# Patient Record
Sex: FEMALE | Race: WHITE
[De-identification: names, ages, dates, MRNs, and addresses within clinical notes are randomized per-mention and may not be internally consistent; named-entity substitution may affect disease eponyms.]

---

## 2021-01-29 ENCOUNTER — TRANSCRIPTION ENCOUNTER (OUTPATIENT)
Age: 41
End: 2021-01-29

## 2021-03-07 ENCOUNTER — TRANSCRIPTION ENCOUNTER (OUTPATIENT)
Age: 41
End: 2021-03-07

## 2021-03-15 ENCOUNTER — TRANSCRIPTION ENCOUNTER (OUTPATIENT)
Age: 41
End: 2021-03-15

## 2021-03-30 ENCOUNTER — TRANSCRIPTION ENCOUNTER (OUTPATIENT)
Age: 41
End: 2021-03-30

## 2021-08-29 ENCOUNTER — TRANSCRIPTION ENCOUNTER (OUTPATIENT)
Age: 41
End: 2021-08-29

## 2022-01-25 ENCOUNTER — TRANSCRIPTION ENCOUNTER (OUTPATIENT)
Age: 42
End: 2022-01-25

## 2022-07-13 ENCOUNTER — NON-APPOINTMENT (OUTPATIENT)
Age: 42
End: 2022-07-13

## 2023-01-16 ENCOUNTER — NON-APPOINTMENT (OUTPATIENT)
Age: 43
End: 2023-01-16

## 2023-08-09 ENCOUNTER — TRANSCRIPTION ENCOUNTER (OUTPATIENT)
Age: 43
End: 2023-08-09

## 2023-08-10 ENCOUNTER — APPOINTMENT (OUTPATIENT)
Dept: ORTHOPEDIC SURGERY | Facility: CLINIC | Age: 43
End: 2023-08-10
Payer: COMMERCIAL

## 2023-08-10 VITALS — HEIGHT: 66 IN | WEIGHT: 185 LBS | BODY MASS INDEX: 29.73 KG/M2

## 2023-08-10 DIAGNOSIS — Z78.9 OTHER SPECIFIED HEALTH STATUS: ICD-10-CM

## 2023-08-10 PROBLEM — Z00.00 ENCOUNTER FOR PREVENTIVE HEALTH EXAMINATION: Status: ACTIVE | Noted: 2023-08-10

## 2023-08-10 PROCEDURE — 29405 APPL SHORT LEG CAST: CPT | Mod: LT

## 2023-08-10 PROCEDURE — 99204 OFFICE O/P NEW MOD 45 MIN: CPT | Mod: 57,25

## 2023-08-10 PROCEDURE — 28470 CLTX METATARSAL FX WO MNP EA: CPT | Mod: LT

## 2023-08-10 NOTE — IMAGING
[Left] : left foot [The fracture is in acceptable alignment. There is progression in healing seen] : The fracture is in acceptable alignment. There is progression in healing seen [de-identified] : Sheehan fracture

## 2023-08-10 NOTE — HISTORY OF PRESENT ILLNESS
[Sudden] : sudden [8] : 8 [7] : 7 [Dull/Aching] : dull/aching [Throbbing] : throbbing [Constant] : constant [Rest] : rest [Meds] : meds [Full time] : Work status: full time [de-identified] : Patient presents today with left foot fracture after falling over the baby gate at home on 8/6/23. Went to [facility] on 8/7/23, had xrays, informed she had a Sheehan fracture, was splinted and given crutches. Patient states she has lateral pain. Patient admits to taking Ibuprofen for pain. Patient is using crutches for ambulation. [] : no [FreeTextEntry1] : left foot fracture [FreeTextEntry3] : 8/6/23 [FreeTextEntry5] :  falling over the baby gate at home [de-identified] : left foot xrays at [facility] [de-identified] :

## 2023-08-10 NOTE — PHYSICAL EXAM
[Left] : left foot and ankle [5th] : 5th [2+] : posterior tibialis pulse: 2+ [Normal] : saphenous nerve sensation normal [] : ambulation with crutches [FreeTextEntry9] : Gross ROM of toes  [de-identified] : deferred

## 2023-08-20 ENCOUNTER — NON-APPOINTMENT (OUTPATIENT)
Age: 43
End: 2023-08-20

## 2023-09-07 ENCOUNTER — APPOINTMENT (OUTPATIENT)
Dept: ORTHOPEDIC SURGERY | Facility: CLINIC | Age: 43
End: 2023-09-07
Payer: COMMERCIAL

## 2023-09-07 VITALS — WEIGHT: 185 LBS | HEIGHT: 66 IN | BODY MASS INDEX: 29.73 KG/M2

## 2023-09-07 PROCEDURE — 73630 X-RAY EXAM OF FOOT: CPT | Mod: LT

## 2023-09-07 PROCEDURE — 99214 OFFICE O/P EST MOD 30 MIN: CPT | Mod: 24

## 2023-09-07 RX ORDER — CEPHALEXIN 500 MG/1
500 CAPSULE ORAL
Qty: 28 | Refills: 0 | Status: ACTIVE | COMMUNITY
Start: 2023-09-07 | End: 1900-01-01

## 2023-09-07 NOTE — HISTORY OF PRESENT ILLNESS
[de-identified] : Follow up left foot Sheehan fracture from 8/6/23. Patient states she still has some lateral pain. Patient denies takin pain medication. Patient is in a short cast and using a post op shoe. Patient is using crutches for ambulation.  Today's Pain 6/10  Left elbow Patient has been experiencing left elbow pain and infection for 2 weeks. She received 1 week of antibiotics in the hospital and had it drained surgically x2, diagnosis of infected olecranon bursitis. Patient has still been experiencing swelling and redness of her elbow since leaving the hospital.

## 2023-09-07 NOTE — PHYSICAL EXAM
[5th] : 5th [2+] : posterior tibialis pulse: 2+ [Normal] : saphenous nerve sensation normal [Left] : left elbow [] : no gross deformity [FreeTextEntry9] : Gross ROM of toes  [de-identified] : deferred

## 2023-09-07 NOTE — IMAGING
[Left] : left foot [No loss of surgical correlation. Bony alignment acceptable. Hardware in appropriate position] : No loss of surgical correlation. Bony alignment acceptable. Hardware in appropriate position [de-identified] : Beginning of bony callus formation

## 2023-09-07 NOTE — ASSESSMENT
[FreeTextEntry1] : 44 y/o patient following up for L 5th metatarsal fracture and L elbow cellulitis and bursitis. Patient still experiencing some tenderness and swelling to left elbow. X-rays L foot show some healing of fracture but not enough to take off cast.  - Continue to stay in cast for 1 more week  - NWB LLE, keep using crutches for ambulation  - Recommend: - rest - ice - compression - elevation  Elbow:  - Rx given for Keflex 500mg, continue to take for 7 days  - Monitor for worsening signs of infection (fevers, chills, pain)  Follow up in 1 week for both elbow and foot

## 2023-09-22 ENCOUNTER — APPOINTMENT (OUTPATIENT)
Dept: ORTHOPEDIC SURGERY | Facility: CLINIC | Age: 43
End: 2023-09-22
Payer: COMMERCIAL

## 2023-09-22 DIAGNOSIS — L03.114 CELLULITIS OF LEFT UPPER LIMB: ICD-10-CM

## 2023-09-22 PROCEDURE — 99024 POSTOP FOLLOW-UP VISIT: CPT

## 2023-09-22 PROCEDURE — 73070 X-RAY EXAM OF ELBOW: CPT | Mod: LT

## 2023-09-22 PROCEDURE — 73620 X-RAY EXAM OF FOOT: CPT | Mod: LT

## 2023-10-27 ENCOUNTER — APPOINTMENT (OUTPATIENT)
Dept: ORTHOPEDIC SURGERY | Facility: CLINIC | Age: 43
End: 2023-10-27
Payer: COMMERCIAL

## 2023-10-27 VITALS — WEIGHT: 185 LBS | HEIGHT: 66 IN | BODY MASS INDEX: 29.73 KG/M2

## 2023-10-27 DIAGNOSIS — S99.192A OTHER PHYSEAL FRACTURE OF LEFT METATARSAL, INITIAL ENCOUNTER FOR CLOSED FRACTURE: ICD-10-CM

## 2023-10-27 DIAGNOSIS — M70.22 OLECRANON BURSITIS, LEFT ELBOW: ICD-10-CM

## 2023-10-27 PROCEDURE — 73630 X-RAY EXAM OF FOOT: CPT | Mod: LT

## 2023-10-27 PROCEDURE — 99024 POSTOP FOLLOW-UP VISIT: CPT

## 2023-10-27 RX ORDER — DICLOFENAC SODIUM 1% 10 MG/G
1 GEL TOPICAL 4 TIMES DAILY
Qty: 1 | Refills: 1 | Status: ACTIVE | COMMUNITY
Start: 2023-10-27 | End: 1900-01-01

## 2024-04-16 ENCOUNTER — APPOINTMENT (OUTPATIENT)
Dept: ORTHOPEDIC SURGERY | Facility: CLINIC | Age: 44
End: 2024-04-16
Payer: COMMERCIAL

## 2024-04-16 VITALS — WEIGHT: 185 LBS | BODY MASS INDEX: 29.73 KG/M2 | HEIGHT: 66 IN

## 2024-04-16 DIAGNOSIS — Z78.9 OTHER SPECIFIED HEALTH STATUS: ICD-10-CM

## 2024-04-16 PROCEDURE — 28470 CLTX METATARSAL FX WO MNP EA: CPT

## 2024-04-16 PROCEDURE — 73610 X-RAY EXAM OF ANKLE: CPT | Mod: LT

## 2024-04-16 PROCEDURE — 99214 OFFICE O/P EST MOD 30 MIN: CPT | Mod: 25

## 2024-04-16 PROCEDURE — 73630 X-RAY EXAM OF FOOT: CPT | Mod: LT

## 2024-04-16 NOTE — ASSESSMENT
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 Kapadia in a [facility] from 8/2023. Patient continues to have persistent pain and symptoms interfering with her ADLs. X-rays today demonstrating non-union of fifth metatarsal lantigua fracture with partial callus formation at fracture site. Fracture is in acceptable position and alignment, fracture gap <1 cm. -Discussed with patient surgical vs. non-surgical options of ORIF vs. bone stimulator. Will submit auth for bone stimulator -Activities as tolerated, avoid strenuous/impact related activities -Rest, ice, compression, elevation, NSAIDs PRN for pain.  -All questions answered -F/u after bone stimulator fitting  The diagnosis was explained in detail. The potential non-surgical and surgical treatments were reviewed. The relative risks and benefits of each option were considered relative to the patients age, activity level, medical history, symptom severity and previously attempted treatments.  The patient was advised to consult with their primary medical provider prior to initiation of any new medications to reduce the risk of adverse effects specific to their long-term home medications and medical history. The risk of gastrointestinal irritation and kidney injury specific to long-term NSAID use was discussed.  Entered by Mj Lagos PA-C acting as scribe. Dr. Torres Attestation The documentation recorded by the scribe, in my presence, accurately reflects the service I, Dr. Torres, personally performed, and the decisions made by me with my edits as appropriate.

## 2024-04-16 NOTE — HISTORY OF PRESENT ILLNESS
[Sudden] : sudden [Dull/Aching] : dull/aching [Throbbing] : throbbing [Household chores] : household chores [Leisure] : leisure [Social interactions] : social interactions [Rest] : rest [6] : 6 [4] : 4 [Sharp] : sharp [Constant] : constant [Full time] : Work status: full time [de-identified] : Patient here for left foot. Patient had 5th metatarsal FX treated by Dr. Kapadia 8/6/2023. Patient states she tripped over a baby gate in 8/2023. Patient states she was put in a cast for approx 6 weeks. Patient states she has occasional throbbing and dull aching pain with weight bearing.  [] : Post Surgical Visit: no [FreeTextEntry1] : Left foot  [FreeTextEntry3] : 8/2023 [FreeTextEntry5] : Patient states she tripped over a baby gate [de-identified] : Movement  [de-identified] :

## 2024-04-16 NOTE — PHYSICAL EXAM
[de-identified] : Examination of the left foot and ankle is as follows: INSPECTION: no swelling, no ecchymosis, no abrasion, laceration, no erythema PALPATION: mildly ttp over 5th metatarsal base ROM: plantarflexion 30 degrees, inversion 25 degrees, eversion 15 degrees, dorsiflexion 15 degrees STRENGTH: dorsiflexion 5/5, plantar flexion 5/5, inversion 5/5, eversion 5/5, EHL 5/5, FHL 5/5 VASCULAR: dorsalis pedis pulse: 2+, posterior tibialis pulse: 2+ NEURO: Sensation present to light touch in all distributions GAIT: mildly antalgic, ambulation without assisted devices  X-rays of the left ankle is as follows:  Ankle 3 view: non-union of 5th metatarsal base fracture at the metaphyseal-diaphyseal junction with minimal callus at fracture site, fracture is in proper position and alignment. Fracture gap < 1cm

## 2024-05-01 ENCOUNTER — APPOINTMENT (OUTPATIENT)
Dept: ORTHOPEDIC SURGERY | Facility: CLINIC | Age: 44
End: 2024-05-01
Payer: COMMERCIAL

## 2024-05-01 ENCOUNTER — NON-APPOINTMENT (OUTPATIENT)
Age: 44
End: 2024-05-01

## 2024-05-01 DIAGNOSIS — S92.355K NONDISPLACED FRACTURE OF FIFTH METATARSAL BONE, LEFT FOOT, SUBSEQUENT ENCOUNTER FOR FRACTURE WITH NONUNION: ICD-10-CM

## 2024-05-01 PROCEDURE — 20974 ESTIM AID BONE HEALG N-INVAS: CPT | Mod: 58

## 2024-05-01 PROCEDURE — 99213 OFFICE O/P EST LOW 20 MIN: CPT | Mod: 25

## 2024-05-01 NOTE — ASSESSMENT
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 Kapadia in [place] [facility] [place] 8/[place]23. Patient continues to have persistent pain and symptoms interfering with her ADLs. X-rays  demonstrating non-union of fifth metatarsal lantigua fracture with partial callus formation at fracture site. Fracture is in acceptable position and alignment, fracture gap <1 cm. -Here today for bone stim fitting. -Activities as tolerated, avoid strenuous/impact related activities -Rest, ice, compression, elevation, NSAIDs PRN for pain.  -All questions answered -F/u 6 weeks with repeat left foot xrays  The diagnosis was explained in detail. The potential non-surgical and surgical treatments were reviewed. The relative risks and benefits of each option were considered relative to the patients age, activity level, medical history, symptom severity and previously attempted treatments.  The patient was advised to consult with their primary medical provider prior to initiation of any new medications to reduce the risk of adverse effects specific to their long-term home medications and medical history. The risk of gastrointestinal irritation and kidney injury specific to long-term NSAID use was discussed.  Entered by Mj Lagos PA-C acting as scribe. Dr. Torres Attestation The documentation recorded by the scribe, in my presence, accurately reflects the service I, Dr. Torres, personally performed, and the decisions made by me with my edits as appropriate.

## 2024-05-01 NOTE — HISTORY OF PRESENT ILLNESS
[Sudden] : sudden [6] : 6 [4] : 4 [Dull/Aching] : dull/aching [Sharp] : sharp [Throbbing] : throbbing [Constant] : constant [Household chores] : household chores [Leisure] : leisure [Social interactions] : social interactions [Rest] : rest [Full time] : Work status: full time [de-identified] : Patient here for left foot. Patient had 5th metatarsal FX treated by Dr. Kapadia 8/6/2023. Patient states she tripped over a baby gate in 8/2023. 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 Patient here for bone stimulator fitting.  [] : Post Surgical Visit: no [FreeTextEntry1] : Left foot  [FreeTextEntry3] : 8/2023 [FreeTextEntry5] : Patient states she tripped over a baby gate [de-identified] : Movement  [de-identified] :

## 2024-05-01 NOTE — PHYSICAL EXAM
[de-identified] : Examination of the left foot and ankle is as follows: INSPECTION: no swelling, no ecchymosis, no abrasion, laceration, no erythema PALPATION: mildly ttp over 5th metatarsal base ROM: plantarflexion 30 degrees, inversion 25 degrees, eversion 15 degrees, dorsiflexion 15 degrees STRENGTH: dorsiflexion 5/5, plantar flexion 5/5, inversion 5/5, eversion 5/5, EHL 5/5, FHL 5/5 VASCULAR: dorsalis pedis pulse: 2+, posterior tibialis pulse: 2+ NEURO: Sensation present to light touch in all distributions GAIT: mildly antalgic, ambulation without assisted devices  X-rays of the left ankle is as follows:  Ankle 3 view: non-union of 5th metatarsal base fracture at the metaphyseal-diaphyseal junction with minimal callus at fracture site, fracture is in proper position and alignment. Fracture gap < 1cm

## 2024-06-12 ENCOUNTER — APPOINTMENT (OUTPATIENT)
Dept: ORTHOPEDIC SURGERY | Facility: CLINIC | Age: 44
End: 2024-06-12
Payer: COMMERCIAL

## 2024-06-12 DIAGNOSIS — S92.355D NONDISPLACED FRACTURE OF FIFTH METATARSAL BONE, LEFT FOOT, SUBSEQUENT ENCOUNTER FOR FRACTURE WITH ROUTINE HEALING: ICD-10-CM

## 2024-06-12 PROCEDURE — 99024 POSTOP FOLLOW-UP VISIT: CPT

## 2024-06-12 PROCEDURE — 73630 X-RAY EXAM OF FOOT: CPT | Mod: LT

## 2024-06-12 NOTE — HISTORY OF PRESENT ILLNESS
[Sudden] : sudden [6] : 6 [4] : 4 [Sharp] : sharp [Constant] : constant [Household chores] : household chores [Leisure] : leisure [Social interactions] : social interactions [Rest] : rest [Full time] : Work status: full time [de-identified] : Patient here for left foot. Patient had 5th metatarsal lantigua FX treated by Dr. Kapadia 8/6/2023. Patient had bone stimulator fitting on 5/1/24. Patient states she has a constant sharp pain with ambulation towards midfoot region.  Reports pain at base of 5th metatarsal is improved compared to last visit.   [] : Post Surgical Visit: no [FreeTextEntry1] : Left foot  [FreeTextEntry3] : 8/2023 [FreeTextEntry5] : Patient states she tripped over a baby gate [de-identified] : Movement  [de-identified] :

## 2024-06-12 NOTE — PHYSICAL EXAM
[de-identified] : Examination of the left foot and ankle is as follows: INSPECTION: no swelling, no ecchymosis, no abrasion, laceration, no erythema PALPATION: mildly ttp over 5th metatarsal base, dorsal 3rd/4th metatarsal shaft ROM: plantarflexion 30 degrees, inversion 25 degrees, eversion 15 degrees, dorsiflexion 15 degrees STRENGTH: dorsiflexion 5/5, plantar flexion 5/5, inversion 5/5, eversion 5/5, EHL 5/5, FHL 5/5 VASCULAR: dorsalis pedis pulse: 2+, posterior tibialis pulse: 2+ NEURO: Sensation present to light touch in all distributions GAIT: normal gait  X-rays of the left ankle is as follows:  Ankle 3 view: 5th metatarsal base fracture at the metaphyseal-diaphyseal junction with increased callus at fracture site, there is a small gap on far lateral aspect of the cortex but majority of fracture is healed compared to xrays from 4/2024.  fracture is in proper position and alignment.

## 2024-06-12 NOTE — ASSESSMENT
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 Kapadia in [place] [facility] [place].  Patient was fitted for bone stim 6 weeks ago now with improving callous formation at fracture site compared to previous xrays in April.  Continue nonsurgical management. -cont bone stim. -Activities as tolerated, avoid strenuous/impact related activities -Rest, ice, compression, elevation, NSAIDs PRN for pain.  -All questions answered -F/u 6 weeks with repeat left foot xrays  The diagnosis was explained in detail. The potential non-surgical and surgical treatments were reviewed. The relative risks and benefits of each option were considered relative to the patients age, activity level, medical history, symptom severity and previously attempted treatments.  The patient was advised to consult with their primary medical provider prior to initiation of any new medications to reduce the risk of adverse effects specific to their long-term home medications and medical history. The risk of gastrointestinal irritation and kidney injury specific to long-term NSAID use was discussed.

## 2024-07-24 ENCOUNTER — APPOINTMENT (OUTPATIENT)
Dept: ORTHOPEDIC SURGERY | Facility: CLINIC | Age: 44
End: 2024-07-24

## 2024-12-24 ENCOUNTER — APPOINTMENT (OUTPATIENT)
Dept: ORTHOPEDIC SURGERY | Facility: CLINIC | Age: 44
End: 2024-12-24
Payer: COMMERCIAL

## 2024-12-24 DIAGNOSIS — M67.479 GANGLION, UNSPECIFIED ANKLE AND FOOT: ICD-10-CM

## 2024-12-24 DIAGNOSIS — S92.355D NONDISPLACED FRACTURE OF FIFTH METATARSAL BONE, LEFT FOOT, SUBSEQUENT ENCOUNTER FOR FRACTURE WITH ROUTINE HEALING: ICD-10-CM

## 2024-12-24 PROCEDURE — 73630 X-RAY EXAM OF FOOT: CPT | Mod: LT

## 2024-12-24 PROCEDURE — 99213 OFFICE O/P EST LOW 20 MIN: CPT

## 2025-02-05 ENCOUNTER — APPOINTMENT (OUTPATIENT)
Dept: ORTHOPEDIC SURGERY | Facility: CLINIC | Age: 45
End: 2025-02-05